# Patient Record
Sex: MALE | Race: WHITE | NOT HISPANIC OR LATINO
[De-identification: names, ages, dates, MRNs, and addresses within clinical notes are randomized per-mention and may not be internally consistent; named-entity substitution may affect disease eponyms.]

---

## 2019-06-27 PROBLEM — Z00.00 ENCOUNTER FOR PREVENTIVE HEALTH EXAMINATION: Status: ACTIVE | Noted: 2019-06-27

## 2019-07-15 ENCOUNTER — APPOINTMENT (OUTPATIENT)
Dept: ORTHOPEDIC SURGERY | Facility: CLINIC | Age: 39
End: 2019-07-15
Payer: MEDICAID

## 2019-07-15 VITALS
HEIGHT: 68 IN | WEIGHT: 150 LBS | BODY MASS INDEX: 22.73 KG/M2 | HEART RATE: 51 BPM | DIASTOLIC BLOOD PRESSURE: 80 MMHG | SYSTOLIC BLOOD PRESSURE: 130 MMHG

## 2019-07-15 DIAGNOSIS — Z78.9 OTHER SPECIFIED HEALTH STATUS: ICD-10-CM

## 2019-07-15 PROCEDURE — 73030 X-RAY EXAM OF SHOULDER: CPT | Mod: 26,RT

## 2019-07-15 PROCEDURE — 99203 OFFICE O/P NEW LOW 30 MIN: CPT

## 2019-07-15 NOTE — HISTORY OF PRESENT ILLNESS
[Right] : right hand dominant [FreeTextEntry1] : He comes in today for evaluation of a right shoulder injury.  He injured his shoulder with after a fall playing soccer 5 months ago.  He has not noted improvement.\par \par - He was referred by Dr. Bergman

## 2019-07-15 NOTE — CONSULT LETTER
[Dear  ___] : Dear  [unfilled], [Consult Letter:] : I had the pleasure of evaluating your patient, [unfilled]. [Please see my note below.] : Please see my note below. [Consult Closing:] : Thank you very much for allowing me to participate in the care of this patient.  If you have any questions, please do not hesitate to contact me. [Sincerely,] : Sincerely, [FreeTextEntry3] : Zane Singh M.D.\par Surgery of the Hand & Upper Extremity\par Orthopaedic Surgery\par Chief, Hand Service, [facility]\par Director, Hand Service, [facility]\par  of Orthopedic Surgery, [facility] [employer] at [facility] \par [facility]\par \par Email: [email or web address] Office Phone: [phone]\par \par

## 2019-07-15 NOTE — DISCUSSION/SUMMARY
[FreeTextEntry1] : He has findings consistent with a right a.c. joint injury, status post a fall 5 months ago.  He has persistent symptoms.\par \par I had a discussion regarding today's visit, the diagnosis, and treatment options / recommendations.  Given his persistent symptoms, 5 months after the injury, I have ordered an MRI.  He will followup after the MRI to review the results and discuss treatment recommendations.\par \par The patient has agreed to this plan of management and has expressed full understanding.  All questions were fully answered to the patient's satisfaction.\par \par Over 50% of the time spent with the patient was on counseling the patient on the above diagnosis, treatment plan and prognosis.

## 2019-07-15 NOTE — PHYSICAL EXAM
[de-identified] : - Constitutional: This is a male in no obvious distress.  \par - Psych: Patient is alert and oriented to person, place and time.  Patient has a normal mood and affect.\par - Cardiovascular: Normal pulses throughout the upper extremities.  No significant varicosities are noted in the upper extremities. \par - Neuro: Strength and sensation are intact throughout the upper extremities.  Patient has normal coordination.\par - Respiratory:  Patient exhibits no evidence of shortness of breath or difficulty breathing.\par - Skin: No rashes, lesions, or other abnormalities are noted in the upper extremities.\par \par ---\par \par Examination of his right shoulder demonstrates swelling and prominence along the a.c. joint.  He has tenderness in this region.  He has a positive crossarm adduction sign.  There is a negative Neer and Gaspar sign.  There are no focal deficits in rotator cuff strength.  He is neurovascularly intact distally. [de-identified] : AP and Y. radiographs demonstrate no obvious fractures or dislocations.  He has an elevated distal clavicle, consistent with an a.c. joint injury.

## 2019-08-12 ENCOUNTER — APPOINTMENT (OUTPATIENT)
Dept: ORTHOPEDIC SURGERY | Facility: CLINIC | Age: 39
End: 2019-08-12
Payer: MEDICAID

## 2019-08-12 DIAGNOSIS — S43.51XA SPRAIN OF RIGHT ACROMIOCLAVICULAR JOINT, INITIAL ENCOUNTER: ICD-10-CM

## 2019-08-12 PROCEDURE — 99214 OFFICE O/P EST MOD 30 MIN: CPT

## 2019-08-12 NOTE — PHYSICAL EXAM
[de-identified] : - Constitutional: This is a male in no obvious distress.  \par - Psych: Patient is alert and oriented to person, place and time.  Patient has a normal mood and affect.\par - Cardiovascular: Normal pulses throughout the upper extremities.  No significant varicosities are noted in the upper extremities. \par - Neuro: Strength and sensation are intact throughout the upper extremities.  Patient has normal coordination.\par - Respiratory:  Patient exhibits no evidence of shortness of breath or difficulty breathing.\par - Skin: No rashes, lesions, or other abnormalities are noted in the upper extremities.\par \par ---\par \par Examination of his right shoulder demonstrates swelling and prominence along the a.c. joint.  He has tenderness in this region.  He has a positive crossarm adduction sign.  There is a negative Neer and Gaspar sign.  There are no focal deficits in rotator cuff strength.  He is neurovascularly intact distally. [de-identified] : \par I reviewed the MRI of his right shoulder.  This demonstrated changes consistent with an a.c. joint injury.  Low-grade rotator cuff tendinosis.  Minimal fluid surrounding the extracapsular portion of the biceps tendon.  Possible cyst at the base of the acromion.\par \par Previous AP and Y. radiographs demonstrated no obvious fractures or dislocations.  He has an elevated distal clavicle, consistent with an a.c. joint injury.

## 2019-08-12 NOTE — DISCUSSION/SUMMARY
[FreeTextEntry1] : I reviewed the MRI results with him.  Further treatment options / recommendations were discussed.  I discussed the potential benefit of a cortisone injection.  He deferred an injection at this time.  He told me that if he does not need to have it, he would like to hold off.  He understands that he may have persistent symptoms.  If he has not noted further improvement over the next several months, then he will return to the office.  If this becomes a chronic condition, the possible need for surgery in the future was discussed with him.\par \par I had a discussion regarding today's visit, the diagnosis, and my treatment recommendations.  The patient has agreed to the above plan of management and has expressed full understanding.  All questions were fully answered to the patient's satisfaction.\par \par I spent at least 25 minutes of face-to-face time with the patient.  Over 50% of this time was spent on counseling the patient on the above diagnosis, treatment plan and prognosis.

## 2019-08-12 NOTE — HISTORY OF PRESENT ILLNESS
[FreeTextEntry1] : Followup regarding right shoulder injury, status post fall 6 months ago.  When he was seen in the office 4 weeks ago, I ordered an MRI to better evaluate his persistent symptoms.  He comes in to review the results and discuss treatment recommendations.\par \par He has noted some improvement in his symptoms.

## 2023-10-20 ENCOUNTER — TRANSCRIPTION ENCOUNTER (OUTPATIENT)
Age: 43
End: 2023-10-20

## 2023-10-21 ENCOUNTER — APPOINTMENT (OUTPATIENT)
Dept: MRI IMAGING | Facility: CLINIC | Age: 43
End: 2023-10-21
Payer: MEDICAID

## 2023-10-21 PROCEDURE — 72148 MRI LUMBAR SPINE W/O DYE: CPT

## 2024-09-20 ENCOUNTER — RX ONLY (RX ONLY)
Age: 44
End: 2024-09-20

## 2024-09-20 ENCOUNTER — OFFICE (OUTPATIENT)
Dept: URBAN - METROPOLITAN AREA CLINIC 97 | Facility: CLINIC | Age: 44
Setting detail: OPHTHALMOLOGY
End: 2024-09-20
Payer: MEDICAID

## 2024-09-20 DIAGNOSIS — H40.013: ICD-10-CM

## 2024-09-20 DIAGNOSIS — H35.3131: ICD-10-CM

## 2024-09-20 DIAGNOSIS — H43.811: ICD-10-CM

## 2024-09-20 DIAGNOSIS — H52.4: ICD-10-CM

## 2024-09-20 PROCEDURE — 92004 COMPRE OPH EXAM NEW PT 1/>: CPT | Performed by: OPHTHALMOLOGY

## 2024-09-20 PROCEDURE — 92250 FUNDUS PHOTOGRAPHY W/I&R: CPT | Performed by: OPHTHALMOLOGY

## 2024-09-20 PROCEDURE — 92015 DETERMINE REFRACTIVE STATE: CPT | Performed by: OPHTHALMOLOGY

## 2024-09-20 ASSESSMENT — CONFRONTATIONAL VISUAL FIELD TEST (CVF)
OD_FINDINGS: FULL
OS_FINDINGS: FULL